# Patient Record
Sex: MALE | Race: WHITE | NOT HISPANIC OR LATINO | Employment: OTHER | URBAN - METROPOLITAN AREA
[De-identification: names, ages, dates, MRNs, and addresses within clinical notes are randomized per-mention and may not be internally consistent; named-entity substitution may affect disease eponyms.]

---

## 2018-12-08 ENCOUNTER — APPOINTMENT (OUTPATIENT)
Dept: GENERAL RADIOLOGY | Facility: HOSPITAL | Age: 73
End: 2018-12-08

## 2018-12-08 ENCOUNTER — APPOINTMENT (OUTPATIENT)
Dept: CT IMAGING | Facility: HOSPITAL | Age: 73
End: 2018-12-08

## 2018-12-08 ENCOUNTER — HOSPITAL ENCOUNTER (EMERGENCY)
Facility: HOSPITAL | Age: 73
Discharge: HOME | End: 2018-12-08
Attending: EMERGENCY MEDICINE | Admitting: EMERGENCY MEDICINE

## 2018-12-08 VITALS
SYSTOLIC BLOOD PRESSURE: 152 MMHG | RESPIRATION RATE: 16 BRPM | DIASTOLIC BLOOD PRESSURE: 84 MMHG | OXYGEN SATURATION: 98 % | BODY MASS INDEX: 22.5 KG/M2 | HEART RATE: 100 BPM | TEMPERATURE: 97.5 F | HEIGHT: 66 IN | WEIGHT: 140 LBS

## 2018-12-08 DIAGNOSIS — E86.1 HYPOVOLEMIA: ICD-10-CM

## 2018-12-08 DIAGNOSIS — R73.9 HYPERGLYCEMIA: Primary | ICD-10-CM

## 2018-12-08 DIAGNOSIS — Z91.199 HISTORY OF NONCOMPLIANCE WITH MEDICAL TREATMENT: ICD-10-CM

## 2018-12-08 LAB
ALBUMIN SERPL-MCNC: 4.41 G/DL (ref 3.2–4.8)
ALBUMIN/GLOB SERPL: 2.2 G/DL (ref 1.5–2.5)
ALP SERPL-CCNC: 89 U/L (ref 25–100)
ALT SERPL W P-5'-P-CCNC: 17 U/L (ref 7–40)
ANION GAP SERPL CALCULATED.3IONS-SCNC: 13 MMOL/L (ref 3–11)
AST SERPL-CCNC: 22 U/L (ref 0–33)
B-OH-BUTYR SERPL-SCNC: 3.4 MMOL/L
BASOPHILS # BLD AUTO: 0.03 10*3/MM3 (ref 0–0.2)
BASOPHILS NFR BLD AUTO: 0.4 % (ref 0–1)
BILIRUB SERPL-MCNC: 1.6 MG/DL (ref 0.3–1.2)
BILIRUB UR QL STRIP: NEGATIVE
BUN BLD-MCNC: 29 MG/DL (ref 9–23)
BUN/CREAT SERPL: 18.7 (ref 7–25)
CALCIUM SPEC-SCNC: 9.5 MG/DL (ref 8.7–10.4)
CHLORIDE SERPL-SCNC: 89 MMOL/L (ref 99–109)
CLARITY UR: CLEAR
CO2 SERPL-SCNC: 26 MMOL/L (ref 20–31)
COLOR UR: YELLOW
CREAT BLD-MCNC: 1.55 MG/DL (ref 0.6–1.3)
DEPRECATED RDW RBC AUTO: 42.2 FL (ref 37–54)
EOSINOPHIL # BLD AUTO: 0.02 10*3/MM3 (ref 0–0.3)
EOSINOPHIL NFR BLD AUTO: 0.3 % (ref 0–3)
ERYTHROCYTE [DISTWIDTH] IN BLOOD BY AUTOMATED COUNT: 13.1 % (ref 11.3–14.5)
GFR SERPL CREATININE-BSD FRML MDRD: 44 ML/MIN/1.73
GLOBULIN UR ELPH-MCNC: 2 GM/DL
GLUCOSE BLD-MCNC: 644 MG/DL (ref 70–100)
GLUCOSE BLDC GLUCOMTR-MCNC: 216 MG/DL (ref 70–130)
GLUCOSE BLDC GLUCOMTR-MCNC: 354 MG/DL (ref 70–130)
GLUCOSE UR STRIP-MCNC: ABNORMAL MG/DL
HBA1C MFR BLD: 11.7 % (ref 4.8–5.6)
HCT VFR BLD AUTO: 41.5 % (ref 38.9–50.9)
HGB BLD-MCNC: 14.8 G/DL (ref 13.1–17.5)
HGB UR QL STRIP.AUTO: NEGATIVE
HOLD SPECIMEN: NORMAL
HOLD SPECIMEN: NORMAL
IMM GRANULOCYTES # BLD: 0.01 10*3/MM3 (ref 0–0.03)
IMM GRANULOCYTES NFR BLD: 0.1 % (ref 0–0.6)
INR PPP: 1.03 (ref 0.91–1.09)
KETONES UR QL STRIP: ABNORMAL
LEUKOCYTE ESTERASE UR QL STRIP.AUTO: NEGATIVE
LYMPHOCYTES # BLD AUTO: 0.55 10*3/MM3 (ref 0.6–4.8)
LYMPHOCYTES NFR BLD AUTO: 7.5 % (ref 24–44)
MCH RBC QN AUTO: 31.7 PG (ref 27–31)
MCHC RBC AUTO-ENTMCNC: 35.7 G/DL (ref 32–36)
MCV RBC AUTO: 88.9 FL (ref 80–99)
MONOCYTES # BLD AUTO: 0.91 10*3/MM3 (ref 0–1)
MONOCYTES NFR BLD AUTO: 12.4 % (ref 0–12)
NEUTROPHILS # BLD AUTO: 5.83 10*3/MM3 (ref 1.5–8.3)
NEUTROPHILS NFR BLD AUTO: 79.4 % (ref 41–71)
NITRITE UR QL STRIP: NEGATIVE
PH UR STRIP.AUTO: <=5 [PH] (ref 5–8)
PLATELET # BLD AUTO: 190 10*3/MM3 (ref 150–450)
PMV BLD AUTO: 10.9 FL (ref 6–12)
POTASSIUM BLD-SCNC: 4.6 MMOL/L (ref 3.5–5.5)
PROT SERPL-MCNC: 6.4 G/DL (ref 5.7–8.2)
PROT UR QL STRIP: NEGATIVE
PROTHROMBIN TIME: 10.8 SECONDS (ref 9.6–11.5)
RBC # BLD AUTO: 4.67 10*6/MM3 (ref 4.2–5.76)
SODIUM BLD-SCNC: 128 MMOL/L (ref 132–146)
SP GR UR STRIP: 1.04 (ref 1–1.03)
TROPONIN I SERPL-MCNC: 0 NG/ML (ref 0–0.07)
UROBILINOGEN UR QL STRIP: ABNORMAL
WBC NRBC COR # BLD: 7.34 10*3/MM3 (ref 3.5–10.8)
WHOLE BLOOD HOLD SPECIMEN: NORMAL
WHOLE BLOOD HOLD SPECIMEN: NORMAL

## 2018-12-08 PROCEDURE — 99284 EMERGENCY DEPT VISIT MOD MDM: CPT

## 2018-12-08 PROCEDURE — 85610 PROTHROMBIN TIME: CPT | Performed by: PHYSICIAN ASSISTANT

## 2018-12-08 PROCEDURE — 85025 COMPLETE CBC W/AUTO DIFF WBC: CPT | Performed by: PHYSICIAN ASSISTANT

## 2018-12-08 PROCEDURE — 84484 ASSAY OF TROPONIN QUANT: CPT

## 2018-12-08 PROCEDURE — 63710000001 INSULIN REGULAR HUMAN PER 5 UNITS: Performed by: PHYSICIAN ASSISTANT

## 2018-12-08 PROCEDURE — 96360 HYDRATION IV INFUSION INIT: CPT

## 2018-12-08 PROCEDURE — 83036 HEMOGLOBIN GLYCOSYLATED A1C: CPT | Performed by: PHYSICIAN ASSISTANT

## 2018-12-08 PROCEDURE — 82010 KETONE BODYS QUAN: CPT | Performed by: EMERGENCY MEDICINE

## 2018-12-08 PROCEDURE — 71046 X-RAY EXAM CHEST 2 VIEWS: CPT

## 2018-12-08 PROCEDURE — 70450 CT HEAD/BRAIN W/O DYE: CPT

## 2018-12-08 PROCEDURE — 82962 GLUCOSE BLOOD TEST: CPT

## 2018-12-08 PROCEDURE — 93005 ELECTROCARDIOGRAM TRACING: CPT | Performed by: PHYSICIAN ASSISTANT

## 2018-12-08 PROCEDURE — 80053 COMPREHEN METABOLIC PANEL: CPT | Performed by: PHYSICIAN ASSISTANT

## 2018-12-08 PROCEDURE — 81003 URINALYSIS AUTO W/O SCOPE: CPT | Performed by: PHYSICIAN ASSISTANT

## 2018-12-08 RX ORDER — FENOFIBRATE 145 MG/1
145 TABLET, COATED ORAL DAILY
COMMUNITY

## 2018-12-08 RX ORDER — SODIUM CHLORIDE 0.9 % (FLUSH) 0.9 %
10 SYRINGE (ML) INJECTION AS NEEDED
Status: DISCONTINUED | OUTPATIENT
Start: 2018-12-08 | End: 2018-12-08 | Stop reason: HOSPADM

## 2018-12-08 RX ORDER — ROSUVASTATIN CALCIUM 10 MG/1
10 TABLET, COATED ORAL DAILY
COMMUNITY

## 2018-12-08 RX ORDER — CLOPIDOGREL BISULFATE 75 MG/1
75 TABLET ORAL DAILY
COMMUNITY

## 2018-12-08 RX ORDER — ESCITALOPRAM OXALATE 10 MG/1
10 TABLET ORAL DAILY
COMMUNITY

## 2018-12-08 RX ORDER — ZOLPIDEM TARTRATE 10 MG/1
10 TABLET ORAL NIGHTLY PRN
COMMUNITY

## 2018-12-08 RX ORDER — METOPROLOL TARTRATE 50 MG/1
50 TABLET, FILM COATED ORAL DAILY
COMMUNITY

## 2018-12-08 RX ORDER — ALLOPURINOL 300 MG/1
300 TABLET ORAL DAILY
COMMUNITY

## 2018-12-08 RX ORDER — ASPIRIN 81 MG/1
81 TABLET ORAL DAILY
COMMUNITY

## 2018-12-08 RX ORDER — LISINOPRIL 10 MG/1
10 TABLET ORAL DAILY
COMMUNITY

## 2018-12-08 RX ADMIN — INSULIN HUMAN 10 UNITS: 100 INJECTION, SOLUTION PARENTERAL at 14:07

## 2018-12-08 RX ADMIN — SODIUM CHLORIDE 1000 ML: 9 INJECTION, SOLUTION INTRAVENOUS at 15:23

## 2018-12-08 RX ADMIN — SODIUM CHLORIDE 1000 ML: 9 INJECTION, SOLUTION INTRAVENOUS at 13:51

## 2018-12-08 NOTE — ED PROVIDER NOTES
"Subjective   Ramon Garner is a 73 y.o.male with a hx of diabetes who presents to the ED with complaints of falls. The patient says that he has been out of his insulin for one week. He says he has not checked his blood sugar in over 1 year. The patient says he has felt \"depleted\" and without energy. He has fallen many times over the past few months. His episodes usually happen in the bathroom. He has hit his bathtub several times and he notes that he hit his head this morning and has a knot. He says that he has also fallen in his bedroom. He notes that his episodes typically occur when he moves from a lying to standing position. He says that when he starts walking or while he is urinating he just gives out. He has had intermittent polydipsia and polyuria. He denies having any syncope, dizziness, or vomiting with these episodes. The patient also notes that he has lost a lot of weight within this last year. He has not had a colonoscopy, but he has had a cologuard. His primary doctor is Dr. Javed. He has an endocrinologist, Dr. Rene, he has not seen in over a year. He has a hx of a triple bypass 14 years ago and his Cardiologist is Dr. Ahmadi at [facility]. There are no other acute complaints at this time.         History provided by:  Patient  Illness   Location:  General  Quality:  Uncontrolled diabetes  Severity:  Moderate  Onset quality:  Gradual  Duration: a long time.  Timing:  Constant  Progression:  Worsening  Chronicity:  Chronic  Context:  Frequent falls. Out of insulin  Relieved by:  Nothing  Worsened by:  Nothing  Associated symptoms: fatigue        Review of Systems   Constitutional: Positive for fatigue and unexpected weight change.   Endocrine: Positive for polydipsia and polyuria.   Neurological: Negative for dizziness and syncope.   All other systems reviewed and are negative.      Past Medical History:   Diagnosis Date   • Diabetes mellitus (CMS/[facility])    • Hyperlipidemia    • Hypertension    • " Insomnia    • Myocardial infarction (CMS/HCC)        No Known Allergies    Past Surgical History:   Procedure Laterality Date   • CHOLECYSTECTOMY     • CORONARY ARTERY BYPASS GRAFT     • KIDNEY STONE SURGERY     • TONSILLECTOMY     • WRIST SURGERY         History reviewed. No pertinent family history.    Social History     Socioeconomic History   • Marital status: Single     Spouse name: Not on file   • Number of children: Not on file   • Years of education: Not on file   • Highest education level: Not on file   Tobacco Use   • Smoking status: Never Smoker   Substance and Sexual Activity   • Alcohol use: Yes     Comment: very rarely   • Drug use: No   • Sexual activity: Defer         Objective   Physical Exam   Constitutional: He is oriented to person, place, and time. He appears well-developed and well-nourished. No distress.   Thin, dry looking. Multiple abrasions and skin tears on the forearms.    HENT:   He has a hematoma on crown of head. It is tender to palpation. No open wounds or crepitus.   Eyes: Conjunctivae are normal. No scleral icterus.   Neck: Normal range of motion. Neck supple.   Cardiovascular: Normal rate, regular rhythm and normal heart sounds.   No murmur heard.  Pulmonary/Chest: Effort normal and breath sounds normal. No respiratory distress.   Abdominal: Soft. Bowel sounds are normal. There is no tenderness.   Musculoskeletal: Normal range of motion. He exhibits no edema.   He has no C,T, or L spine tenderness.    Neurological: He is alert and oriented to person, place, and time.   Skin: Skin is warm and dry.   Psychiatric: He has a normal mood and affect. His behavior is normal.   Nursing note and vitals reviewed.      Procedures         ED Course  ED Course as of Dec 13 0716   Sat Dec 08, 2018   1540 Glucose: (!!) 644 [RS]   1540 Glucose: (!) 354 [RS]   1540 Creatinine: (!) 1.55 [RS]   1540 Hemoglobin A1C: (!) 11.70 [RS]   1540 Beta-Hydroxybutyrate Quant: (!) 3.400 [RS]   1602 Long discussion  at this point the patient's blood sugars on the way down.  He is feeling much better.  We discussed using medications.  According blood sugars and he has a primary care doctors to follow-up with tomorrow.  [LUIS M]      ED Course User Index  [LUIS M] Steve Dempsey PA  [RS] Henry Harden MD     No results found for this or any previous visit (from the past 24 hour(s)).  Note: In addition to lab results from this visit, the labs listed above may include labs taken at another facility or during a different encounter within the last 24 hours. Please correlate lab times with ED admission and discharge times for further clarification of the services performed during this visit.    XR Chest 2 View   Final Result   No acute cardiopulmonary abnormality.       DICTATED:   12/8/2018   EDITED/ls :   12/8/2018        This report was finalized on 12/8/2018 3:21 PM by Amol Lorenzo.          CT Head Without Contrast   Final Result   1. Scalp contusion without skull fracture.   2. No intracranial acute abnormality.        DICTATED:   12/8/2018   EDITED/ls :   12/8/2018        This report was finalized on 12/8/2018 3:19 PM by Amol Lorenzo.            Vitals:    12/08/18 1530 12/08/18 1600 12/08/18 1630 12/08/18 1747   BP: 125/95 140/94 146/87 152/84   BP Location:    Right arm   Patient Position:    Lying   Pulse:    100   Resp:    16   Temp:    97.5 °F (36.4 °C)   TempSrc:    Oral   SpO2: 97% 99% 97% 98%   Weight:       Height:         Medications   sodium chloride 0.9 % bolus 1,000 mL (0 mL Intravenous Stopped 12/8/18 1700)   insulin regular (humuLIN R,novoLIN R) injection 10 Units (10 Units Intravenous Given 12/8/18 1407)   sodium chloride 0.9 % bolus 1,000 mL (0 mL Intravenous Stopped 12/8/18 1500)     ECG/EMG Results (last 24 hours)     ** No results found for the last 24 hours. **            No results found for this or any previous visit (from the past 24 hour(s)).  Note: In addition to lab results from this visit, the  labs listed above may include labs taken at another facility or during a different encounter within the last 24 hours. Please correlate lab times with ED admission and discharge times for further clarification of the services performed during this visit.    XR Chest 2 View   Final Result   No acute cardiopulmonary abnormality.       DICTATED:   12/8/2018   EDITED/ls :   12/8/2018        This report was finalized on 12/8/2018 3:21 PM by Amol Lorenzo.          CT Head Without Contrast   Final Result   1. Scalp contusion without skull fracture.   2. No intracranial acute abnormality.        DICTATED:   12/8/2018   EDITED/ls :   12/8/2018        This report was finalized on 12/8/2018 3:19 PM by Amol Lorenzo.            Vitals:    12/08/18 1530 12/08/18 1600 12/08/18 1630 12/08/18 1747   BP: 125/95 140/94 146/87 152/84   BP Location:    Right arm   Patient Position:    Lying   Pulse:    100   Resp:    16   Temp:    97.5 °F (36.4 °C)   TempSrc:    Oral   SpO2: 97% 99% 97% 98%   Weight:       Height:         Medications   sodium chloride 0.9 % bolus 1,000 mL (0 mL Intravenous Stopped 12/8/18 1700)   insulin regular (humuLIN R,novoLIN R) injection 10 Units (10 Units Intravenous Given 12/8/18 1407)   sodium chloride 0.9 % bolus 1,000 mL (0 mL Intravenous Stopped 12/8/18 1500)     ECG/EMG Results (last 24 hours)     Procedure Component Value Units Date/Time    ECG 12 Lead [[id number]] Collected:  12/08/18 1301     Updated:  12/08/18 1421    Narrative:       Test Reason : chest pain  Blood Pressure : **/** mmHG  Vent. Rate : 098 BPM     Atrial Rate : 098 BPM     P-R Int : 134 ms          QRS Dur : 064 ms      QT Int : 342 ms       P-R-T Axes : 072 060 065 degrees     QTc Int : 436 ms    Normal sinus rhythm  Normal ECG  When compared with ECG of 18-MAR-2014 10:56,  Vent. rate has increased BY  38 BPM  Confirmed by NATAN CARLOS MD ([id number]) on 12/8/2018 2:21:19 PM    Referred By:  edmd           Confirmed By:NATAN CARLOS MD                     MDM  Number of Diagnoses or Management Options  History of noncompliance with medical treatment: new and requires workup  Hyperglycemia: new and requires workup  Hypovolemia: new and requires workup     Amount and/or Complexity of Data Reviewed  Clinical lab tests: reviewed and ordered  Tests in the radiology section of CPT®: ordered and reviewed  Decide to obtain previous medical records or to obtain history from someone other than the patient: yes  Discuss the patient with other providers: yes    Patient Progress  Patient progress: stable      Final diagnoses:   Hyperglycemia   History of noncompliance with medical treatment   Hypovolemia       Documentation assistance provided by nasreen Walsh.  Information recorded by the nasreen was done at my direction and has been verified and validated by me.     Malcom Walsh  12/08/18 1240       Steve Dempsey PA  12/13/18 2669

## 2018-12-11 LAB — GLUCOSE BLDC GLUCOMTR-MCNC: >599 MG/DL (ref 70–130)
